# Patient Record
Sex: FEMALE | Race: WHITE | NOT HISPANIC OR LATINO | Employment: OTHER | URBAN - NONMETROPOLITAN AREA
[De-identification: names, ages, dates, MRNs, and addresses within clinical notes are randomized per-mention and may not be internally consistent; named-entity substitution may affect disease eponyms.]

---

## 2020-11-10 ENCOUNTER — OFFICE VISIT (OUTPATIENT)
Dept: OBSTETRICS AND GYNECOLOGY | Facility: CLINIC | Age: 35
End: 2020-11-10

## 2020-11-10 VITALS
BODY MASS INDEX: 46.01 KG/M2 | DIASTOLIC BLOOD PRESSURE: 84 MMHG | SYSTOLIC BLOOD PRESSURE: 126 MMHG | WEIGHT: 250 LBS | HEIGHT: 62 IN

## 2020-11-10 DIAGNOSIS — E28.2 PCOS (POLYCYSTIC OVARIAN SYNDROME): ICD-10-CM

## 2020-11-10 DIAGNOSIS — E66.01 MORBID OBESITY WITH BMI OF 40.0-44.9, ADULT (HCC): ICD-10-CM

## 2020-11-10 DIAGNOSIS — N93.9 ABNORMAL UTERINE BLEEDING (AUB): Primary | ICD-10-CM

## 2020-11-10 PROCEDURE — 99204 OFFICE O/P NEW MOD 45 MIN: CPT | Performed by: OBSTETRICS & GYNECOLOGY

## 2020-11-10 PROCEDURE — 58100 BIOPSY OF UTERUS LINING: CPT | Performed by: OBSTETRICS & GYNECOLOGY

## 2020-11-10 RX ORDER — PANTOPRAZOLE SODIUM 40 MG/1
TABLET, DELAYED RELEASE ORAL
Status: ON HOLD | COMMUNITY
Start: 2020-10-07 | End: 2023-03-23

## 2020-11-12 ENCOUNTER — TELEPHONE (OUTPATIENT)
Dept: OBSTETRICS AND GYNECOLOGY | Facility: CLINIC | Age: 35
End: 2020-11-12

## 2020-11-12 RX ORDER — MEDROXYPROGESTERONE ACETATE 10 MG/1
10 TABLET ORAL DAILY
Qty: 30 TABLET | Refills: 5 | Status: SHIPPED | OUTPATIENT
Start: 2020-11-12 | End: 2020-11-24

## 2020-11-12 NOTE — TELEPHONE ENCOUNTER
Start Provera 10 mg daily x 1 month.  I have called this into the pharmacy listed in her chart.  We will follow-up on 11/24 and discuss other medical options moving forward.  Thanks

## 2020-11-12 NOTE — TELEPHONE ENCOUNTER
----- Message from Parul Ayala sent at 11/12/2020  9:58 AM EST -----  Pt said she had been on her cycle for 3 wks & stopped on Sun & then  started back this morning.     She is requesting a callback. (Dr Rodgers's pt)

## 2020-11-13 DIAGNOSIS — N93.9 ABNORMAL UTERINE BLEEDING (AUB): ICD-10-CM

## 2020-11-17 PROBLEM — E28.2 PCOS (POLYCYSTIC OVARIAN SYNDROME): Status: ACTIVE | Noted: 2020-11-17

## 2020-11-17 PROBLEM — N93.9 ABNORMAL UTERINE BLEEDING (AUB): Status: ACTIVE | Noted: 2020-11-17

## 2020-11-17 PROBLEM — E66.01 MORBID OBESITY WITH BMI OF 40.0-44.9, ADULT (HCC): Status: ACTIVE | Noted: 2020-11-17
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
## 2020-11-24 ENCOUNTER — TELEPHONE (OUTPATIENT)
Dept: OBSTETRICS AND GYNECOLOGY | Facility: CLINIC | Age: 35
End: 2020-11-24

## 2020-11-24 ENCOUNTER — OFFICE VISIT (OUTPATIENT)
Dept: OBSTETRICS AND GYNECOLOGY | Facility: CLINIC | Age: 35
End: 2020-11-24

## 2020-11-24 VITALS
SYSTOLIC BLOOD PRESSURE: 152 MMHG | BODY MASS INDEX: 46.38 KG/M2 | HEIGHT: 62 IN | WEIGHT: 252 LBS | DIASTOLIC BLOOD PRESSURE: 80 MMHG

## 2020-11-24 DIAGNOSIS — R73.03 PREDIABETES: ICD-10-CM

## 2020-11-24 DIAGNOSIS — E28.2 PCOS (POLYCYSTIC OVARIAN SYNDROME): Primary | ICD-10-CM

## 2020-11-24 DIAGNOSIS — N91.5 OLIGOMENORRHEA, UNSPECIFIED TYPE: ICD-10-CM

## 2020-11-24 PROCEDURE — 99214 OFFICE O/P EST MOD 30 MIN: CPT | Performed by: OBSTETRICS & GYNECOLOGY

## 2020-11-24 RX ORDER — DROSPIRENONE 4 MG/1
1 TABLET, FILM COATED ORAL DAILY
Qty: 90 TABLET | Refills: 5 | Status: SHIPPED | OUTPATIENT
Start: 2020-11-24 | End: 2020-11-27

## 2020-11-24 NOTE — TELEPHONE ENCOUNTER
----- Message from Mary Rich sent at 11/24/2020  2:12 PM EST -----  Regarding: MEDICATION QUESTION  Patient states that new medication that Dr. Rodgers sent is requiring a prior authorization and it is not in stock. Patient wants to know if she should stay on her current medication until they can get the new med in.    Patient call back: [phone]
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
## 2020-11-24 NOTE — TELEPHONE ENCOUNTER
Please call her insurance company for prior authorization.  Continue Provera until we get coverage for Slynd.  Thanks

## 2020-11-27 RX ORDER — ACETAMINOPHEN AND CODEINE PHOSPHATE 120; 12 MG/5ML; MG/5ML
1 SOLUTION ORAL DAILY
Qty: 28 TABLET | Refills: 12 | Status: ON HOLD | OUTPATIENT
Start: 2020-11-27 | End: 2023-03-23

## 2020-11-27 NOTE — TELEPHONE ENCOUNTER
Please let this patient know that I have called in a new prescription.  This particular pill has a higher failure rate, so I would recommend condoms for contraception as well.  Thanks

## 2023-03-23 ENCOUNTER — HOSPITAL ENCOUNTER (INPATIENT)
Facility: HOSPITAL | Age: 38
LOS: 4 days | Discharge: HOME | DRG: 885 | End: 2023-03-27
Attending: PSYCHIATRY & NEUROLOGY | Admitting: PSYCHIATRY & NEUROLOGY
Payer: MEDICAID

## 2023-03-23 ENCOUNTER — HOSPITAL ENCOUNTER (EMERGENCY)
Facility: HOSPITAL | Age: 38
Discharge: PSYCHIATRIC OR SUBSTANCE USE FACILITY | DRG: 885 | End: 2023-03-23
Attending: STUDENT IN AN ORGANIZED HEALTH CARE EDUCATION/TRAINING PROGRAM | Admitting: STUDENT IN AN ORGANIZED HEALTH CARE EDUCATION/TRAINING PROGRAM
Payer: MEDICAID

## 2023-03-23 VITALS
DIASTOLIC BLOOD PRESSURE: 79 MMHG | OXYGEN SATURATION: 96 % | TEMPERATURE: 97.1 F | HEIGHT: 63 IN | WEIGHT: 250 LBS | SYSTOLIC BLOOD PRESSURE: 136 MMHG | HEART RATE: 85 BPM | BODY MASS INDEX: 44.3 KG/M2 | RESPIRATION RATE: 20 BRPM

## 2023-03-23 DIAGNOSIS — R45.851 SUICIDAL IDEATIONS: Primary | ICD-10-CM

## 2023-03-23 PROBLEM — F32.9 MDD (MAJOR DEPRESSIVE DISORDER): Status: ACTIVE | Noted: 2023-03-23

## 2023-03-23 LAB
ALBUMIN SERPL-MCNC: 4 G/DL (ref 3.5–5.2)
ALBUMIN/GLOB SERPL: 1 G/DL
ALP SERPL-CCNC: 116 U/L (ref 39–117)
ALT SERPL W P-5'-P-CCNC: 23 U/L (ref 1–33)
AMPHET+METHAMPHET UR QL: NEGATIVE
AMPHETAMINES UR QL: NEGATIVE
ANION GAP SERPL CALCULATED.3IONS-SCNC: 9.5 MMOL/L (ref 5–15)
AST SERPL-CCNC: 19 U/L (ref 1–32)
B-HCG UR QL: NEGATIVE
BACTERIA UR QL AUTO: ABNORMAL /HPF
BARBITURATES UR QL SCN: NEGATIVE
BASOPHILS # BLD AUTO: 0.05 10*3/MM3 (ref 0–0.2)
BASOPHILS NFR BLD AUTO: 0.3 % (ref 0–1.5)
BENZODIAZ UR QL SCN: NEGATIVE
BILIRUB SERPL-MCNC: 0.2 MG/DL (ref 0–1.2)
BILIRUB UR QL STRIP: NEGATIVE
BUN SERPL-MCNC: 7 MG/DL (ref 6–20)
BUN/CREAT SERPL: 7.8 (ref 7–25)
BUPRENORPHINE SERPL-MCNC: NEGATIVE NG/ML
CALCIUM SPEC-SCNC: 9.5 MG/DL (ref 8.6–10.5)
CANNABINOIDS SERPL QL: NEGATIVE
CHLORIDE SERPL-SCNC: 103 MMOL/L (ref 98–107)
CLARITY UR: CLEAR
CO2 SERPL-SCNC: 26.5 MMOL/L (ref 22–29)
COCAINE UR QL: NEGATIVE
COLOR UR: YELLOW
CREAT SERPL-MCNC: 0.9 MG/DL (ref 0.57–1)
DEPRECATED RDW RBC AUTO: 51.7 FL (ref 37–54)
EGFRCR SERPLBLD CKD-EPI 2021: 84.6 ML/MIN/1.73
EOSINOPHIL # BLD AUTO: 0.18 10*3/MM3 (ref 0–0.4)
EOSINOPHIL NFR BLD AUTO: 1.2 % (ref 0.3–6.2)
ERYTHROCYTE [DISTWIDTH] IN BLOOD BY AUTOMATED COUNT: 15.9 % (ref 12.3–15.4)
ETHANOL BLD-MCNC: <10 MG/DL (ref 0–10)
ETHANOL UR QL: <0.01 %
FLUAV RNA RESP QL NAA+PROBE: NOT DETECTED
FLUBV RNA ISLT QL NAA+PROBE: NOT DETECTED
GLOBULIN UR ELPH-MCNC: 3.9 GM/DL
GLUCOSE SERPL-MCNC: 96 MG/DL (ref 65–99)
GLUCOSE UR STRIP-MCNC: NEGATIVE MG/DL
HCT VFR BLD AUTO: 46.3 % (ref 34–46.6)
HGB BLD-MCNC: 14.6 G/DL (ref 12–15.9)
HGB UR QL STRIP.AUTO: ABNORMAL
HOLD SPECIMEN: NORMAL
HOLD SPECIMEN: NORMAL
HYALINE CASTS UR QL AUTO: ABNORMAL /LPF
IMM GRANULOCYTES # BLD AUTO: 0.06 10*3/MM3 (ref 0–0.05)
IMM GRANULOCYTES NFR BLD AUTO: 0.4 % (ref 0–0.5)
KETONES UR QL STRIP: NEGATIVE
LEUKOCYTE ESTERASE UR QL STRIP.AUTO: NEGATIVE
LYMPHOCYTES # BLD AUTO: 4.72 10*3/MM3 (ref 0.7–3.1)
LYMPHOCYTES NFR BLD AUTO: 32.3 % (ref 19.6–45.3)
MAGNESIUM SERPL-MCNC: 2 MG/DL (ref 1.6–2.6)
MCH RBC QN AUTO: 27.9 PG (ref 26.6–33)
MCHC RBC AUTO-ENTMCNC: 31.5 G/DL (ref 31.5–35.7)
MCV RBC AUTO: 88.5 FL (ref 79–97)
METHADONE UR QL SCN: NEGATIVE
MONOCYTES # BLD AUTO: 0.56 10*3/MM3 (ref 0.1–0.9)
MONOCYTES NFR BLD AUTO: 3.8 % (ref 5–12)
NEUTROPHILS NFR BLD AUTO: 62 % (ref 42.7–76)
NEUTROPHILS NFR BLD AUTO: 9.05 10*3/MM3 (ref 1.7–7)
NITRITE UR QL STRIP: NEGATIVE
NRBC BLD AUTO-RTO: 0 /100 WBC (ref 0–0.2)
OPIATES UR QL: NEGATIVE
OXYCODONE UR QL SCN: NEGATIVE
PCP UR QL SCN: NEGATIVE
PH UR STRIP.AUTO: 6 [PH] (ref 5–8)
PLATELET # BLD AUTO: 317 10*3/MM3 (ref 140–450)
PMV BLD AUTO: 10.5 FL (ref 6–12)
POTASSIUM SERPL-SCNC: 3.8 MMOL/L (ref 3.5–5.2)
PROPOXYPH UR QL: NEGATIVE
PROT SERPL-MCNC: 7.9 G/DL (ref 6–8.5)
PROT UR QL STRIP: NEGATIVE
RBC # BLD AUTO: 5.23 10*6/MM3 (ref 3.77–5.28)
RBC # UR STRIP: ABNORMAL /HPF
REF LAB TEST METHOD: ABNORMAL
SARS-COV-2 RNA RESP QL NAA+PROBE: NOT DETECTED
SODIUM SERPL-SCNC: 139 MMOL/L (ref 136–145)
SP GR UR STRIP: 1.01 (ref 1–1.03)
SQUAMOUS #/AREA URNS HPF: ABNORMAL /HPF
TRICYCLICS UR QL SCN: NEGATIVE
UROBILINOGEN UR QL STRIP: ABNORMAL
WBC # UR STRIP: ABNORMAL /HPF
WBC NRBC COR # BLD: 14.62 10*3/MM3 (ref 3.4–10.8)
WHOLE BLOOD HOLD COAG: NORMAL
WHOLE BLOOD HOLD SPECIMEN: NORMAL

## 2023-03-23 PROCEDURE — 80053 COMPREHEN METABOLIC PANEL: CPT | Performed by: PHYSICIAN ASSISTANT

## 2023-03-23 PROCEDURE — 82077 ASSAY SPEC XCP UR&BREATH IA: CPT | Performed by: PHYSICIAN ASSISTANT

## 2023-03-23 PROCEDURE — 85025 COMPLETE CBC W/AUTO DIFF WBC: CPT | Performed by: PHYSICIAN ASSISTANT

## 2023-03-23 PROCEDURE — 80306 DRUG TEST PRSMV INSTRMNT: CPT | Performed by: PHYSICIAN ASSISTANT

## 2023-03-23 PROCEDURE — 99285 EMERGENCY DEPT VISIT HI MDM: CPT

## 2023-03-23 PROCEDURE — 93010 ELECTROCARDIOGRAM REPORT: CPT | Performed by: INTERNAL MEDICINE

## 2023-03-23 PROCEDURE — C9803 HOPD COVID-19 SPEC COLLECT: HCPCS

## 2023-03-23 PROCEDURE — 93005 ELECTROCARDIOGRAM TRACING: CPT | Performed by: PSYCHIATRY & NEUROLOGY

## 2023-03-23 PROCEDURE — 81025 URINE PREGNANCY TEST: CPT | Performed by: PHYSICIAN ASSISTANT

## 2023-03-23 PROCEDURE — 83735 ASSAY OF MAGNESIUM: CPT | Performed by: PHYSICIAN ASSISTANT

## 2023-03-23 PROCEDURE — 36415 COLL VENOUS BLD VENIPUNCTURE: CPT

## 2023-03-23 PROCEDURE — 87636 SARSCOV2 & INF A&B AMP PRB: CPT | Performed by: PHYSICIAN ASSISTANT

## 2023-03-23 PROCEDURE — 81001 URINALYSIS AUTO W/SCOPE: CPT | Performed by: PHYSICIAN ASSISTANT

## 2023-03-23 RX ORDER — ONDANSETRON 4 MG/1
4 TABLET, FILM COATED ORAL EVERY 6 HOURS PRN
Status: DISCONTINUED | OUTPATIENT
Start: 2023-03-23 | End: 2023-03-27 | Stop reason: HOSPADM

## 2023-03-23 RX ORDER — IBUPROFEN 400 MG/1
400 TABLET ORAL EVERY 6 HOURS PRN
Status: DISCONTINUED | OUTPATIENT
Start: 2023-03-23 | End: 2023-03-27 | Stop reason: HOSPADM

## 2023-03-23 RX ORDER — BENZONATATE 100 MG/1
100 CAPSULE ORAL 3 TIMES DAILY PRN
Status: DISCONTINUED | OUTPATIENT
Start: 2023-03-23 | End: 2023-03-27 | Stop reason: HOSPADM

## 2023-03-23 RX ORDER — NICOTINE 21 MG/24HR
1 PATCH, TRANSDERMAL 24 HOURS TRANSDERMAL ONCE
Status: DISCONTINUED | OUTPATIENT
Start: 2023-03-23 | End: 2023-03-23 | Stop reason: HOSPADM

## 2023-03-23 RX ORDER — TRAZODONE HYDROCHLORIDE 50 MG/1
50 TABLET ORAL NIGHTLY PRN
Status: DISCONTINUED | OUTPATIENT
Start: 2023-03-23 | End: 2023-03-27 | Stop reason: HOSPADM

## 2023-03-23 RX ORDER — ECHINACEA PURPUREA EXTRACT 125 MG
2 TABLET ORAL AS NEEDED
Status: DISCONTINUED | OUTPATIENT
Start: 2023-03-23 | End: 2023-03-27 | Stop reason: HOSPADM

## 2023-03-23 RX ORDER — ALUMINA, MAGNESIA, AND SIMETHICONE 2400; 2400; 240 MG/30ML; MG/30ML; MG/30ML
15 SUSPENSION ORAL EVERY 6 HOURS PRN
Status: DISCONTINUED | OUTPATIENT
Start: 2023-03-23 | End: 2023-03-27 | Stop reason: HOSPADM

## 2023-03-23 RX ORDER — HYDROXYZINE 50 MG/1
50 TABLET, FILM COATED ORAL EVERY 6 HOURS PRN
Status: DISCONTINUED | OUTPATIENT
Start: 2023-03-23 | End: 2023-03-27 | Stop reason: HOSPADM

## 2023-03-23 RX ORDER — LOPERAMIDE HYDROCHLORIDE 2 MG/1
2 CAPSULE ORAL
Status: DISCONTINUED | OUTPATIENT
Start: 2023-03-23 | End: 2023-03-27 | Stop reason: HOSPADM

## 2023-03-23 RX ORDER — BENZTROPINE MESYLATE 1 MG/ML
1 INJECTION INTRAMUSCULAR; INTRAVENOUS ONCE AS NEEDED
Status: DISCONTINUED | OUTPATIENT
Start: 2023-03-23 | End: 2023-03-27 | Stop reason: HOSPADM

## 2023-03-23 RX ORDER — ACETAMINOPHEN 325 MG/1
650 TABLET ORAL EVERY 6 HOURS PRN
Status: DISCONTINUED | OUTPATIENT
Start: 2023-03-23 | End: 2023-03-27 | Stop reason: HOSPADM

## 2023-03-23 RX ORDER — FAMOTIDINE 20 MG/1
20 TABLET, FILM COATED ORAL 2 TIMES DAILY PRN
Status: DISCONTINUED | OUTPATIENT
Start: 2023-03-23 | End: 2023-03-27 | Stop reason: HOSPADM

## 2023-03-23 RX ORDER — BENZTROPINE MESYLATE 1 MG/1
2 TABLET ORAL ONCE AS NEEDED
Status: DISCONTINUED | OUTPATIENT
Start: 2023-03-23 | End: 2023-03-27 | Stop reason: HOSPADM

## 2023-03-23 RX ORDER — NICOTINE 21 MG/24HR
1 PATCH, TRANSDERMAL 24 HOURS TRANSDERMAL
Status: DISCONTINUED | OUTPATIENT
Start: 2023-03-24 | End: 2023-03-24

## 2023-03-23 RX ADMIN — NICOTINE TRANSDERMAL SYSTEM 1 PATCH: 21 PATCH, EXTENDED RELEASE TRANSDERMAL at 16:12
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
## 2023-03-23 NOTE — NURSING NOTE
Patient arrived to intake and states that [facility] cps had her knife. She states that she gave it to them when they arrived at her house. The [facility] police department gave her a ride here today. She states that they came out because there were reports of cocroaches and bed bugs. She states they have been there all day. And even took her to the laundry mat and washed all her clothing not including what she has on. Patient states while there she opened up to them about her depression and bipolar and told them that she was having thoughts of wanting to slit her wrists. Patient states that her son and her lives with her dad and he is controlling and constantly puts her down and calls her names all the time and she stays depressed. Anxiety and depression 10/10. Patient denies HI or AVH. Patient also denies etoh or drug use. States I only drink caffeine and smoke cigarettes all day. Patient states that [employer] is helping her and will be putting her son in foster care today until she gets out which she hopes is not not too long. Sleep poor.

## 2023-03-23 NOTE — ED PROVIDER NOTES
"Subjective   History of Present Illness  37-year-old female who presents to the ED today for a mental health evaluation.  She reports that she has been having suicidal ideations for about 2 months.  She reports that she lives with her father who is a \"narcissistic asshole.\"  She states he called [employer], lazy and a bad mother.  She states she made a plan to slit her wrist today.  She denies any homicidal ideations.  She denies any drug or alcohol use.  She states her appetite and sleep have both been poor.    History provided by:  Patient  Mental Health Problem  Presenting symptoms: depression and suicidal thoughts    Degree of incapacity (severity):  Severe  Onset quality:  Gradual  Duration:  2 months  Timing:  Constant  Progression:  Worsening  Chronicity:  New  Context: not alcohol use and not drug abuse    Relieved by:  Nothing  Worsened by:  Family interactions  Associated symptoms: no appetite change and no insomnia    Risk factors: hx of mental illness        Review of Systems   Constitutional: Negative for appetite change.   HENT: Negative.    Eyes: Negative.    Respiratory: Negative.    Cardiovascular: Negative.    Gastrointestinal: Negative.    Genitourinary: Negative.    Musculoskeletal: Negative.    Skin: Negative.    Neurological: Negative.    Psychiatric/Behavioral: Positive for dysphoric mood, sleep disturbance and suicidal ideas. The patient does not have insomnia.    All other systems reviewed and are negative.      Past Medical History:   Diagnosis Date   • Abnormal Pap smear of cervix    • Anxiety    • Bipolar disorder (HCC)    • Depression    • Hiatal hernia    • Migraine    • Ovarian cyst    • Pica    • Polycystic ovary syndrome    • Self-injurious behavior     from age 15 to late 20's   • Substance abuse (HCC)     reports hx of MARLENY- clean and sober since 27 years old   • Suicide attempt (HCC)     early 20's- cut right wrist with broken glass   • Trauma    • Trichotillomania        Allergies "   Allergen Reactions   • Codeine Other (See Comments)     As a child       Past Surgical History:   Procedure Laterality Date   • CHOLECYSTECTOMY  2014   • TONSILLECTOMY AND ADENOIDECTOMY Bilateral     at 5 years old       Family History   Problem Relation Age of Onset   • Drug abuse Mother    • Breast cancer Mother    • Seizures Father    • Alcohol abuse Father    • Breast cancer Father    • Prostate cancer Father    • Melanoma Father    • Lung cancer Father    • Clotting disorder Father    • Glaucoma Father    • Kidney disease Father    • Schizophrenia Brother    • Breast cancer Paternal Aunt    • Breast cancer Maternal Grandmother    • Breast cancer Paternal Grandmother        Social History     Socioeconomic History   • Marital status: Single   • Number of children: 1   • Years of education: 10   • Highest education level: GED or equivalent   Tobacco Use   • Smoking status: Every Day   • Smokeless tobacco: Never   Vaping Use   • Vaping Use: Never used   Substance and Sexual Activity   • Alcohol use: Never   • Drug use: Not Currently     Comment: reports she has been clean and sober for 10 years   • Sexual activity: Not Currently     Partners: Male     Birth control/protection: Abstinence           Objective   Physical Exam  Vitals and nursing note reviewed.   Constitutional:       General: She is not in acute distress.     Appearance: Normal appearance.   HENT:      Head: Normocephalic and atraumatic.      Right Ear: External ear normal.      Left Ear: External ear normal.   Eyes:      Conjunctiva/sclera: Conjunctivae normal.      Pupils: Pupils are equal, round, and reactive to light.   Cardiovascular:      Rate and Rhythm: Normal rate and regular rhythm.      Pulses: Normal pulses.      Heart sounds: Normal heart sounds.   Pulmonary:      Effort: Pulmonary effort is normal.      Breath sounds: Normal breath sounds.   Abdominal:      General: Bowel sounds are normal.      Palpations: Abdomen is soft.    Musculoskeletal:         General: Normal range of motion.      Cervical back: Normal range of motion and neck supple.   Skin:     General: Skin is warm and dry.      Capillary Refill: Capillary refill takes less than 2 seconds.   Neurological:      General: No focal deficit present.      Mental Status: She is alert and oriented to person, place, and time.   Psychiatric:         Mood and Affect: Mood and affect normal.         Speech: Speech normal.         Behavior: Behavior normal. Behavior is cooperative.         Thought Content: Thought content includes suicidal ideation. Thought content does not include homicidal ideation. Thought content includes suicidal plan.         Procedures        Results for orders placed or performed during the hospital encounter of 03/23/23   COVID-19 and FLU A/B PCR - Swab, Nasopharynx    Specimen: Nasopharynx; Swab   Result Value Ref Range    COVID19 Not Detected Not Detected - Ref. Range    Influenza A PCR Not Detected Not Detected    Influenza B PCR Not Detected Not Detected   Comprehensive Metabolic Panel    Specimen: Arm, Right; Blood   Result Value Ref Range    Glucose 96 65 - 99 mg/dL    BUN 7 6 - 20 mg/dL    Creatinine 0.90 0.57 - 1.00 mg/dL    Sodium 139 136 - 145 mmol/L    Potassium 3.8 3.5 - 5.2 mmol/L    Chloride 103 98 - 107 mmol/L    CO2 26.5 22.0 - 29.0 mmol/L    Calcium 9.5 8.6 - 10.5 mg/dL    Total Protein 7.9 6.0 - 8.5 g/dL    Albumin 4.0 3.5 - 5.2 g/dL    ALT (SGPT) 23 1 - 33 U/L    AST (SGOT) 19 1 - 32 U/L    Alkaline Phosphatase 116 39 - 117 U/L    Total Bilirubin 0.2 0.0 - 1.2 mg/dL    Globulin 3.9 gm/dL    A/G Ratio 1.0 g/dL    BUN/Creatinine Ratio 7.8 7.0 - 25.0    Anion Gap 9.5 5.0 - 15.0 mmol/L    eGFR 84.6 >60.0 mL/min/1.73   Pregnancy, Urine - Urine, Clean Catch    Specimen: Urine, Clean Catch   Result Value Ref Range    HCG, Urine QL Negative Negative   Urinalysis With Microscopic If Indicated (No Culture) - Urine, Clean Catch    Specimen: Urine, Clean  Catch   Result Value Ref Range    Color, UA Yellow Yellow, Straw    Appearance, UA Clear Clear    pH, UA 6.0 5.0 - 8.0    Specific Gravity, UA 1.012 1.005 - 1.030    Glucose, UA Negative Negative    Ketones, UA Negative Negative    Bilirubin, UA Negative Negative    Blood, UA Small (1+) (A) Negative    Protein, UA Negative Negative    Leuk Esterase, UA Negative Negative    Nitrite, UA Negative Negative    Urobilinogen, UA 0.2 E.U./dL 0.2 - 1.0 E.U./dL   Ethanol    Specimen: Arm, Right; Blood   Result Value Ref Range    Ethanol <10 0 - 10 mg/dL    Ethanol % <0.010 %   Urine Drug Screen - Urine, Clean Catch    Specimen: Urine, Clean Catch   Result Value Ref Range    THC, Screen, Urine Negative Negative    Phencyclidine (PCP), Urine Negative Negative    Cocaine Screen, Urine Negative Negative    Methamphetamine, Ur Negative Negative    Opiate Screen Negative Negative    Amphetamine Screen, Urine Negative Negative    Benzodiazepine Screen, Urine Negative Negative    Tricyclic Antidepressants Screen Negative Negative    Methadone Screen, Urine Negative Negative    Barbiturates Screen, Urine Negative Negative    Oxycodone Screen, Urine Negative Negative    Propoxyphene Screen Negative Negative    Buprenorphine, Screen, Urine Negative Negative   Magnesium    Specimen: Arm, Right; Blood   Result Value Ref Range    Magnesium 2.0 1.6 - 2.6 mg/dL   CBC Auto Differential    Specimen: Arm, Right; Blood   Result Value Ref Range    WBC 14.62 (H) 3.40 - 10.80 10*3/mm3    RBC 5.23 3.77 - 5.28 10*6/mm3    Hemoglobin 14.6 12.0 - 15.9 g/dL    Hematocrit 46.3 34.0 - 46.6 %    MCV 88.5 79.0 - 97.0 fL    MCH 27.9 26.6 - 33.0 pg    MCHC 31.5 31.5 - 35.7 g/dL    RDW 15.9 (H) 12.3 - 15.4 %    RDW-SD 51.7 37.0 - 54.0 fl    MPV 10.5 6.0 - 12.0 fL    Platelets 317 140 - 450 10*3/mm3    Neutrophil % 62.0 42.7 - 76.0 %    Lymphocyte % 32.3 19.6 - 45.3 %    Monocyte % 3.8 (L) 5.0 - 12.0 %    Eosinophil % 1.2 0.3 - 6.2 %    Basophil % 0.3 0.0 - 1.5  %    Immature Grans % 0.4 0.0 - 0.5 %    Neutrophils, Absolute 9.05 (H) 1.70 - 7.00 10*3/mm3    Lymphocytes, Absolute 4.72 (H) 0.70 - 3.10 10*3/mm3    Monocytes, Absolute 0.56 0.10 - 0.90 10*3/mm3    Eosinophils, Absolute 0.18 0.00 - 0.40 10*3/mm3    Basophils, Absolute 0.05 0.00 - 0.20 10*3/mm3    Immature Grans, Absolute 0.06 (H) 0.00 - 0.05 10*3/mm3    nRBC 0.0 0.0 - 0.2 /100 WBC   Urinalysis, Microscopic Only - Urine, Clean Catch    Specimen: Urine, Clean Catch   Result Value Ref Range    RBC, UA 3-5 (A) None Seen, 0-2 /HPF    WBC, UA 3-5 (A) None Seen, 0-2 /HPF    Bacteria, UA 1+ (A) None Seen /HPF    Squamous Epithelial Cells, UA 3-6 (A) None Seen, 0-2 /HPF    Hyaline Casts, UA None Seen None Seen /LPF    Methodology Manual Light Microscopy    Green Top (Gel)   Result Value Ref Range    Extra Tube Hold for add-ons.    Lavender Top   Result Value Ref Range    Extra Tube hold for add-on    Gold Top - SST   Result Value Ref Range    Extra Tube Hold for add-ons.    Light Blue Top   Result Value Ref Range    Extra Tube Hold for add-ons.          ED Course                                           Medical Decision Making  37-year-old female who presents to the ED today for a mental health evaluation.  She was medically cleared for a psychiatric evaluation.  Psychiatry was consulted who recommended inpatient admission.    Suicidal ideations: complicated acute illness or injury  Amount and/or Complexity of Data Reviewed  Labs: ordered.          Final diagnoses:   Suicidal ideations       ED Disposition  ED Disposition     ED Disposition   DC/Transfer to Behavioral Health    Condition   Stable    Comment   --             No follow-up provider specified.       Medication List      No changes were made to your prescriptions during this visit.          Guerline Matias, PA  03/23/23 0917

## 2023-03-23 NOTE — ACP (ADVANCE CARE PLANNING)
Patient requested to go outside to smoke. No smoking policy discussed. Offered pt a nicotine patch. ER provider made aware.

## 2023-03-23 NOTE — NURSING NOTE
Called and spoke to Dr. Fierro. Intake information provided and labs discussed. Instructed that she can come in with routine orders. [id number]

## 2023-03-24 ENCOUNTER — APPOINTMENT (OUTPATIENT)
Dept: GENERAL RADIOLOGY | Facility: HOSPITAL | Age: 38
DRG: 885 | End: 2023-03-24
Payer: MEDICAID

## 2023-03-24 LAB
QT INTERVAL: 384 MS
QTC INTERVAL: 432 MS

## 2023-03-24 PROCEDURE — 99223 1ST HOSP IP/OBS HIGH 75: CPT | Performed by: PSYCHIATRY & NEUROLOGY

## 2023-03-24 PROCEDURE — 73120 X-RAY EXAM OF HAND: CPT

## 2023-03-24 RX ORDER — FLUOXETINE HYDROCHLORIDE 20 MG/1
20 CAPSULE ORAL DAILY
Status: DISCONTINUED | OUTPATIENT
Start: 2023-03-24 | End: 2023-03-27 | Stop reason: HOSPADM

## 2023-03-24 RX ORDER — LAMOTRIGINE 100 MG/1
25 TABLET ORAL EVERY 12 HOURS SCHEDULED
Status: DISCONTINUED | OUTPATIENT
Start: 2023-03-24 | End: 2023-03-27 | Stop reason: HOSPADM

## 2023-03-24 RX ORDER — METRONIDAZOLE 250 MG/1
500 TABLET ORAL EVERY 8 HOURS SCHEDULED
Status: DISCONTINUED | OUTPATIENT
Start: 2023-03-24 | End: 2023-03-27 | Stop reason: HOSPADM

## 2023-03-24 RX ADMIN — NICOTINE TRANSDERMAL SYSTEM 1 PATCH: 21 PATCH, EXTENDED RELEASE TRANSDERMAL at 08:17

## 2023-03-24 RX ADMIN — ACETAMINOPHEN 650 MG: 325 TABLET ORAL at 20:30

## 2023-03-24 RX ADMIN — METRONIDAZOLE 500 MG: 250 TABLET ORAL at 15:38

## 2023-03-24 RX ADMIN — LAMOTRIGINE 25 MG: 100 TABLET ORAL at 20:30

## 2023-03-24 RX ADMIN — FLUOXETINE HYDROCHLORIDE 20 MG: 20 CAPSULE ORAL at 14:37

## 2023-03-24 RX ADMIN — METRONIDAZOLE 500 MG: 250 TABLET ORAL at 21:50

## 2023-03-24 RX ADMIN — NICOTINE POLACRILEX 4 MG: 2 GUM, CHEWING BUCCAL at 17:05
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
## 2023-03-24 NOTE — PLAN OF CARE
Goal Outcome Evaluation:  Plan of Care Reviewed With: patient  Patient Agreement with Plan of Care: agrees     Progress: improving pt. Denies depression denies anxiety denies a/v/h pt. Has played cards through out day talkative laughing she wears a white hat to cover head per pt. Hx. Pulling her hair out she has been pleasant through out shift .
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
## 2023-03-24 NOTE — PROGRESS NOTES
Navigator is helping Primary Therapist with the following referrals:    [facility] - [phone]  -Intake staff states to call day of/ day before discharge with referral information.  3/24

## 2023-03-24 NOTE — PLAN OF CARE
Goal Outcome Evaluation:  Plan of Care Reviewed With: patient  Patient Agreement with Plan of Care: agrees     Progress: no change  Outcome Evaluation: Patient denied SI, HI, AVH. Calm and cooperative.

## 2023-03-25 LAB
BILIRUB UR QL STRIP: NEGATIVE
CLARITY UR: CLEAR
COLOR UR: YELLOW
GLUCOSE UR STRIP-MCNC: NEGATIVE MG/DL
HGB UR QL STRIP.AUTO: NEGATIVE
KETONES UR QL STRIP: NEGATIVE
LEUKOCYTE ESTERASE UR QL STRIP.AUTO: NEGATIVE
NITRITE UR QL STRIP: NEGATIVE
PH UR STRIP.AUTO: 6 [PH] (ref 5–8)
PROT UR QL STRIP: NEGATIVE
SP GR UR STRIP: 1.01 (ref 1–1.03)
UROBILINOGEN UR QL STRIP: NORMAL

## 2023-03-25 PROCEDURE — 99232 SBSQ HOSP IP/OBS MODERATE 35: CPT | Performed by: PSYCHIATRY & NEUROLOGY

## 2023-03-25 PROCEDURE — 81003 URINALYSIS AUTO W/O SCOPE: CPT | Performed by: PSYCHIATRY & NEUROLOGY

## 2023-03-25 RX ORDER — DIPHENHYDRAMINE HCL 25 MG
25 CAPSULE ORAL ONCE
Status: COMPLETED | OUTPATIENT
Start: 2023-03-26 | End: 2023-03-26

## 2023-03-25 RX ORDER — NICOTINE 21 MG/24HR
1 PATCH, TRANSDERMAL 24 HOURS TRANSDERMAL DAILY
Status: DISCONTINUED | OUTPATIENT
Start: 2023-03-25 | End: 2023-03-27 | Stop reason: HOSPADM

## 2023-03-25 RX ADMIN — METRONIDAZOLE 500 MG: 250 TABLET ORAL at 14:26

## 2023-03-25 RX ADMIN — ACETAMINOPHEN 650 MG: 325 TABLET ORAL at 15:03

## 2023-03-25 RX ADMIN — FLUOXETINE HYDROCHLORIDE 20 MG: 20 CAPSULE ORAL at 08:45

## 2023-03-25 RX ADMIN — METRONIDAZOLE 500 MG: 250 TABLET ORAL at 21:49

## 2023-03-25 RX ADMIN — SALINE NASAL SPRAY 2 SPRAY: 1.5 SOLUTION NASAL at 21:50

## 2023-03-25 RX ADMIN — NICOTINE TRANSDERMAL SYSTEM 1 PATCH: 21 PATCH, EXTENDED RELEASE TRANSDERMAL at 09:58

## 2023-03-25 RX ADMIN — LAMOTRIGINE 25 MG: 100 TABLET ORAL at 08:45

## 2023-03-25 RX ADMIN — METRONIDAZOLE 500 MG: 250 TABLET ORAL at 08:45

## 2023-03-25 RX ADMIN — LAMOTRIGINE 25 MG: 100 TABLET ORAL at 20:38

## 2023-03-25 RX ADMIN — ACETAMINOPHEN 650 MG: 325 TABLET ORAL at 08:46

## 2023-03-25 RX ADMIN — IBUPROFEN 400 MG: 400 TABLET, FILM COATED ORAL at 02:51

## 2023-03-25 NOTE — NURSING NOTE
Pt c/o left hand first finger and thumb pain 7/10. Trace edema no redness noted. Called Dr. Bolden. New orders given for tylenol, ice, and xray. Pt already took tylenol this evening. Pt verbalized understanding. Ice applied now. Pt instructed not to take off pillowcase and not to leave on more than 15 min at time and to take break from ice for at least 20 min.

## 2023-03-25 NOTE — PROGRESS NOTES
"      Inpatient Psych Progress Note     Clinician: Saroj Ball MD  Admission Date: 3/23/2023  08:21 EDT 03/25/23    Behavioral Health Treatment Plan and Problem List: I have reviewed and approved the Behavioral Health Treatment Plan and Problem list.    Allergies  Allergies   Allergen Reactions   • Fish-Derived Products Nausea And Vomiting   • Mushroom Nausea And Vomiting   • Onion Nausea And Vomiting   • Codeine Other (See Comments)     As a child       Hospital Day: 2 days      Assessment completed within view of staff    History  CC/clinical focus: depression, SI    Interval HPI: Patient seen and evaluated by me.  Chart reviewed. Patient reports feeling a little better today. She have ongoing depression. No SI/HI. She is tolerating her medications well with no reported side effects.   Med Compliant.  ROS otherwise as below.      Interval Review of Systems:   General ROS: negative for - fever or malaise  Endocrine ROS: negative for - palpitations  Respiratory ROS: no cough, shortness of breath, or wheezing  Cardiovascular ROS: no chest pain or dyspnea on exertion  Gastrointestinal ROS: no abdominal pain,no black or bloody stools    /96 (BP Location: Right arm, Patient Position: Sitting)   Pulse 97   Temp 98.1 °F (36.7 °C) (Temporal)   Resp 18   Ht 160 cm (63\")   Wt 113 kg (249 lb)   LMP 03/20/2023   SpO2 96%   BMI 44.11 kg/m²     Mental Status Exam  Mood: dysphoric and depressed  Affect: constricted   Thought Processes: linear, logical, and goal directed  Thought Content: normal  Hallucinations: no  Suicidal Thoughts: denies  Suicidal Plan/Intent: denies  Hopelesness:no  Homicidal Thoughts:  denies      Medical Decision Making:   Labs:     Lab Results (last 24 hours)     ** No results found for the last 24 hours. **            Radiology:     Imaging Results (Last 24 Hours)     Procedure Component Value Units Date/Time    XR Hand 2 View Left [[id number]] Collected: 03/24/23 7687     Updated: " 03/24/23 2322    Narrative:      CR Hand 2 Vws LT    INDICATION:   Pain in the thumb and first finger    COMPARISON:   None available.    FINDINGS:   PA and lateral views of the left hand.  No fracture or dislocation.  No bone erosion or destruction.  No foreign body.      Impression:      Negative left hand.    Signer Name: Gregorio Poe MD   Signed: 3/24/2023 11:20 PM   Workstation Name: [id number]    [facility]            EKG:     ECG/EMG Results (most recent)     Procedure Component Value Units Date/Time    ECG 12 Lead Other [[id number]] Collected: 03/23/23 1724     Updated: 03/24/23 0022     QT Interval 384 ms      QTC Interval 432 ms     Narrative:      Test Reason : Baseline Cardiac Status~  Blood Pressure :   */*   mmHG  Vent. Rate :  76 BPM     Atrial Rate :  76 BPM     P-R Int : 146 ms          QRS Dur :  76 ms      QT Int : 384 ms       P-R-T Axes :  31  61  55 degrees     QTc Int : 432 ms    Normal sinus rhythm  Normal ECG  No previous ECGs available  Confirmed by Joey Richardson ([id number]) on 3/24/2023 12:21:18 AM    Referred By: SALLY           Confirmed By: Joey Richardson           Medications:  FLUoxetine, 20 mg, Oral, Daily  lamoTRIgine, 25 mg, Oral, Q12H  metroNIDAZOLE, 500 mg, Oral, Q8H           All medications reviewed.      Assessment and Plan:     Suicidal Ideation  - SI with plan  - Admit for crisis stabilization  - SP3     Unspecified bipolar disorder  - Continue lamotrigine 25mg BID  - Continue fluoxetine 20mg daily  - We will establish outpatient psychiatric care following hospitalization     Tooth abscess  - Continue augmentin     Alcohol use disorder, severe, dependence, in sustained remission  - 10y sober     Stimulant use disorder, severe, dependence, in sustained remission  - 10y sober    Rule out UTI  - UA concerning for UTI  - Will check culture         Continue hospitalization for safety and stabilization.  Continue current level of Special Precautions (q15 minute  checks).        This note was generated by a scribe, Clarence Vasquez. The work documented in this note was completed, reviewed, and approved by the attending psychiatrist as designated Dr. Saroj Ball electronic signature.     I, Saroj Ball MD, personally performed the services described in this documentation as scribed by the above named individual and is both accurate and complete.
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
## 2023-03-25 NOTE — PLAN OF CARE
Goal Outcome Evaluation:  Plan of Care Reviewed With: patient  Patient Agreement with Plan of Care: agrees        Pt states anxiety 5, depression 5. Pt is calm and cooperative with staff, med compliant.

## 2023-03-26 PROCEDURE — 99232 SBSQ HOSP IP/OBS MODERATE 35: CPT | Performed by: PSYCHIATRY & NEUROLOGY

## 2023-03-26 PROCEDURE — 63710000001 DIPHENHYDRAMINE PER 50 MG: Performed by: PSYCHIATRY & NEUROLOGY

## 2023-03-26 RX ORDER — DIPHENHYDRAMINE HCL 25 MG
25 CAPSULE ORAL NIGHTLY
Status: DISCONTINUED | OUTPATIENT
Start: 2023-03-26 | End: 2023-03-27 | Stop reason: HOSPADM

## 2023-03-26 RX ADMIN — LAMOTRIGINE 25 MG: 100 TABLET ORAL at 20:21

## 2023-03-26 RX ADMIN — SALINE NASAL SPRAY 2 SPRAY: 1.5 SOLUTION NASAL at 11:34

## 2023-03-26 RX ADMIN — METRONIDAZOLE 500 MG: 250 TABLET ORAL at 21:02

## 2023-03-26 RX ADMIN — METRONIDAZOLE 500 MG: 250 TABLET ORAL at 08:24

## 2023-03-26 RX ADMIN — SALINE NASAL SPRAY 2 SPRAY: 1.5 SOLUTION NASAL at 17:05

## 2023-03-26 RX ADMIN — LAMOTRIGINE 25 MG: 100 TABLET ORAL at 08:24

## 2023-03-26 RX ADMIN — FLUOXETINE HYDROCHLORIDE 20 MG: 20 CAPSULE ORAL at 08:24

## 2023-03-26 RX ADMIN — DIPHENHYDRAMINE HYDROCHLORIDE 25 MG: 25 CAPSULE ORAL at 00:09

## 2023-03-26 RX ADMIN — DIPHENHYDRAMINE HYDROCHLORIDE 25 MG: 25 CAPSULE ORAL at 21:49

## 2023-03-26 RX ADMIN — METRONIDAZOLE 500 MG: 250 TABLET ORAL at 13:39

## 2023-03-26 RX ADMIN — NICOTINE TRANSDERMAL SYSTEM 1 PATCH: 21 PATCH, EXTENDED RELEASE TRANSDERMAL at 08:24

## 2023-03-26 NOTE — PROGRESS NOTES
"      Inpatient Psych Progress Note     Clinician: Saroj Ball MD  Admission Date: 3/23/2023  08:50 EDT 03/26/23    Behavioral Health Treatment Plan and Problem List: I have reviewed and approved the Behavioral Health Treatment Plan and Problem list.    Allergies  Allergies   Allergen Reactions   • Fish-Derived Products Nausea And Vomiting   • Mushroom Nausea And Vomiting   • Onion Nausea And Vomiting   • Codeine Other (See Comments)     As a child       Hospital Day: 3 days      Assessment completed within view of staff    History  CC/clinical focus: depression, SI    Interval HPI: Patient seen and evaluated by me.  Chart reviewed. Patient reports that she is feeling a little better today. She denies any side effects from her medications. No SI/HI. She slept well last night after taking Benadryl and is requesting this again tonight.   Med Compliant.  ROS otherwise as below.      Interval Review of Systems:   General ROS: negative for - fever or malaise  Endocrine ROS: negative for - palpitations  Respiratory ROS: no cough, shortness of breath, or wheezing  Cardiovascular ROS: no chest pain or dyspnea on exertion  Gastrointestinal ROS: no abdominal pain,no black or bloody stools    /92 (BP Location: Right arm, Patient Position: Sitting)   Pulse 80   Temp 96.6 °F (35.9 °C) (Temporal)   Resp 18   Ht 160 cm (63\")   Wt 113 kg (249 lb)   LMP 03/20/2023   SpO2 97%   BMI 44.11 kg/m²     Mental Status Exam  Mood: dysphoric and depressed  Affect: constricted   Thought Processes: linear, logical, and goal directed  Thought Content: normal  Hallucinations: no  Suicidal Thoughts: denies  Suicidal Plan/Intent: denies  Hopelesness:no  Homicidal Thoughts:  denies      Medical Decision Making:   Labs:     Lab Results (last 24 hours)     Procedure Component Value Units Date/Time    Urinalysis With Culture If Indicated - Urine, Clean Catch [[id number]]  (Normal) Collected: 03/25/23 1820    Specimen: Urine, Clean Catch " Updated: 03/25/23 1842     Color, UA Yellow     Appearance, UA Clear     pH, UA 6.0     Specific Gravity, UA 1.011     Glucose, UA Negative     Ketones, UA Negative     Bilirubin, UA Negative     Blood, UA Negative     Protein, UA Negative     Leuk Esterase, UA Negative     Nitrite, UA Negative     Urobilinogen, UA 0.2 E.U./dL    Narrative:      In absence of clinical symptoms, the presence of pyuria, bacteria, and/or nitrites on the urinalysis result does not correlate with infection.  Urine microscopic not indicated.            Radiology:     Imaging Results (Last 24 Hours)     ** No results found for the last 24 hours. **            EKG:     ECG/EMG Results (most recent)     Procedure Component Value Units Date/Time    ECG 12 Lead Other [[id number]] Collected: 03/23/23 1724     Updated: 03/24/23 0022     QT Interval 384 ms      QTC Interval 432 ms     Narrative:      Test Reason : Baseline Cardiac Status~  Blood Pressure :   */*   mmHG  Vent. Rate :  76 BPM     Atrial Rate :  76 BPM     P-R Int : 146 ms          QRS Dur :  76 ms      QT Int : 384 ms       P-R-T Axes :  31  61  55 degrees     QTc Int : 432 ms    Normal sinus rhythm  Normal ECG  No previous ECGs available  Confirmed by Joey Richardson ([id number]) on 3/24/2023 12:21:18 AM    Referred By: SALLY           Confirmed By: Joey Richardson           Medications:  FLUoxetine, 20 mg, Oral, Daily  lamoTRIgine, 25 mg, Oral, Q12H  metroNIDAZOLE, 500 mg, Oral, Q8H  nicotine, 1 patch, Transdermal, Daily           All medications reviewed.         Assessment and Plan:      Suicidal Ideation  - SI with plan  - Admit for crisis stabilization  - SP3     Unspecified bipolar disorder  - Continue lamotrigine 25mg BID  - Continue fluoxetine 20mg daily  - We will establish outpatient psychiatric care following hospitalization    Insomnia  - Benadryl 25 mg at bedtime  - Discussed with patient that she should not take Benadryl long-term for sleep, she does not plan to use long-term      Tooth abscess  - Continue augmentin     Alcohol use disorder, severe, dependence, in sustained remission  - 10y sober     Stimulant use disorder, severe, dependence, in sustained remission  - 10y sober     Rule out UTI  - UA initially concerning for UTI  - Repeat UA normal             Continue hospitalization for safety and stabilization.  Continue current level of Special Precautions (q15 minute checks).        This note was generated by a scribe, Clarence Vasquez. The work documented in this note was completed, reviewed, and approved by the attending psychiatrist as designated Dr. Saroj Ball electronic signature.     I, Saroj Ball MD, personally performed the services described in this documentation as scribed by the above named individual and is both accurate and complete.

## 2023-03-26 NOTE — PLAN OF CARE
Problem: Adult Behavioral Health Plan of Care  Goal: Optimized Coping Skills in Response to Life Stressors  Intervention: Promote Effective Coping Strategies  Recent Flowsheet Documentation  Taken 3/26/2023 0749 by Sara Harrison RN  Supportive Measures: positive reinforcement provided     Problem: Adult Behavioral Health Plan of Care  Goal: Develops/Participates in Therapeutic Section to Support Successful Transition  Intervention: Foster Therapeutic Section  Recent Flowsheet Documentation  Taken 3/26/2023 0749 by Sara Harrison RN  Trust Relationship/Rapport:   reassurance provided   thoughts/feelings acknowledged   questions encouraged   questions answered   empathic listening provided   emotional support provided   care explained   choices provided   Goal Outcome Evaluation:  Plan of Care Reviewed With: patient  Patient Agreement with Plan of Care: agrees     Progress: improving

## 2023-03-26 NOTE — NURSING NOTE
Pt c/o insomnia. She is not comfortable taking vistaril or trazadone since she has never taken before. Pt requests one benadryl. Called on called md, Dr. NELLY Ball, new orders given for one time med. Pt tolerated well. She is currently resting in bed eyes closed.

## 2023-03-27 VITALS
SYSTOLIC BLOOD PRESSURE: 131 MMHG | HEIGHT: 63 IN | DIASTOLIC BLOOD PRESSURE: 90 MMHG | OXYGEN SATURATION: 97 % | WEIGHT: 249 LBS | BODY MASS INDEX: 44.12 KG/M2 | TEMPERATURE: 98.1 F | RESPIRATION RATE: 16 BRPM | HEART RATE: 78 BPM

## 2023-03-27 PROBLEM — F31.9 BIPOLAR DISORDER, UNSPECIFIED: Status: ACTIVE | Noted: 2023-03-23

## 2023-03-27 PROBLEM — K04.7 TOOTH ABSCESS: Status: ACTIVE | Noted: 2023-03-27

## 2023-03-27 PROCEDURE — 99239 HOSP IP/OBS DSCHRG MGMT >30: CPT | Performed by: PSYCHIATRY & NEUROLOGY

## 2023-03-27 RX ORDER — FLUOXETINE HYDROCHLORIDE 20 MG/1
20 CAPSULE ORAL DAILY
Qty: 30 CAPSULE | Refills: 0 | Status: SHIPPED | OUTPATIENT
Start: 2023-03-28

## 2023-03-27 RX ORDER — METRONIDAZOLE 500 MG/1
500 TABLET ORAL EVERY 8 HOURS SCHEDULED
Qty: 21 TABLET | Refills: 0 | Status: SHIPPED | OUTPATIENT
Start: 2023-03-27 | End: 2023-04-03

## 2023-03-27 RX ORDER — LAMOTRIGINE 25 MG/1
25 TABLET ORAL EVERY 12 HOURS SCHEDULED
Qty: 60 TABLET | Refills: 0 | Status: SHIPPED | OUTPATIENT
Start: 2023-03-27

## 2023-03-27 RX ADMIN — ACETAMINOPHEN 650 MG: 325 TABLET ORAL at 09:03

## 2023-03-27 RX ADMIN — LAMOTRIGINE 25 MG: 100 TABLET ORAL at 08:32

## 2023-03-27 RX ADMIN — SALINE NASAL SPRAY 2 SPRAY: 1.5 SOLUTION NASAL at 08:33

## 2023-03-27 RX ADMIN — FLUOXETINE HYDROCHLORIDE 20 MG: 20 CAPSULE ORAL at 08:33

## 2023-03-27 RX ADMIN — NICOTINE TRANSDERMAL SYSTEM 1 PATCH: 21 PATCH, EXTENDED RELEASE TRANSDERMAL at 08:32

## 2023-03-27 RX ADMIN — METRONIDAZOLE 500 MG: 250 TABLET ORAL at 13:04

## 2023-03-27 RX ADMIN — METRONIDAZOLE 500 MG: 250 TABLET ORAL at 08:32

## 2023-03-27 NOTE — DISCHARGE INSTR - APPOINTMENTS
Patient to admit to:    [facility]  [address] [place] [address] [phone]

## 2023-03-27 NOTE — PLAN OF CARE
Goal Outcome Evaluation:  Plan of Care Reviewed With: patient  Patient Agreement with Plan of Care: agrees     Progress: improving  Outcome Evaluation: Pt was calm and copperative this shift with no issues or concerns.

## 2023-03-27 NOTE — PROGRESS NOTES
Navigator is helping Primary Therapist with the following referrals:     [facility] - [phone]  -Intake staff states to call day of/ day before discharge with referral information.  3/24  -Staff states to fax over discharge summary for review/approval.  Sent  3/27  -Still in review. 3/27  -Patient approved. They will  around 1:30pm.  3/27

## 2023-03-27 NOTE — DISCHARGE SUMMARY
"      PSYCHIATRIC DISCHARGE SUMMARY     Patient Identification:  Name:  Carey Serna  Age:  37 y.o.  Sex:  female  :  1985  MRN:  [id number]  Visit Number:  [id number]    Date of Admission:3/23/2023   Date of Discharge:  3/27/2023    Discharge Diagnosis:  Principal Problem:    Bipolar disorder, unspecified (HCC)  Active Problems:    Tooth abscess      Admission Diagnosis:  MDD (major depressive disorder) [F32.9]     Hospital Course  Patient is a 37 y.o. female presented with depression and reports of SI.  Admitted for crisis stabilization.  No acutely concerning labs on admission.  Patient resumed on previously successful fluoxetine 20 mg daily and lamotrigine at decreased dose of 25 mg twice daily, to eventually be titrated by outpatient provider.  Patient reports interpersonal difficulties with her father led to mood disturbance.  She reported intermittent passive SI, but denied active desire to ever hurt herself.  She was pleasant and appropriate during her stay, exhibiting no behavior concerning for harm to herself or others.  She voiced rapid improvement and ready for discharge today, with hopes to be admitted to [facility] before she returns home.  Outpatient care ascertained.    On the day of discharge, patient denied SI, HI or AVH. Patient was stable and appropriate by the conclusion of this admission, denying significant symptoms of mood, psychotic or thought disorder. Patient showed improvement of presenting symptoms and was deemed appropriate for discharge today.    Mental Status Exam upon discharge:   Mood \"better\"   Affect-congruent, appropriate, stable  Thought Content-goal directed, no delusional material present  Thought process-linear, organized.  Suicidality: No SI  Homicidality: No HI  Perception: No AH/VH    Procedures Performed         Consults:   Consults     No orders found from 2023 to 3/24/2023.          Pertinent Test Results:   Lab Results (last 7 days)     Procedure " Pt missed procedure apt   Wants to reschedule with a nurse Component Value Units Date/Time    Urinalysis With Culture If Indicated - Urine, Clean Catch [[id number]]  (Normal) Collected: 03/25/23 1820    Specimen: Urine, Clean Catch Updated: 03/25/23 1842     Color, UA Yellow     Appearance, UA Clear     pH, UA 6.0     Specific Gravity, UA 1.011     Glucose, UA Negative     Ketones, UA Negative     Bilirubin, UA Negative     Blood, UA Negative     Protein, UA Negative     Leuk Esterase, UA Negative     Nitrite, UA Negative     Urobilinogen, UA 0.2 E.U./dL    Narrative:      In absence of clinical symptoms, the presence of pyuria, bacteria, and/or nitrites on the urinalysis result does not correlate with infection.  Urine microscopic not indicated.          Condition on Discharge:  improved    Vital Signs  Temp:  [98.5 °F (36.9 °C)] 98.5 °F (36.9 °C)  Heart Rate:  [82-92] 82  Resp:  [18] 18  BP: (138-147)/(84-95) 138/84    Discharge Disposition:  Home or Self Care    Discharge Medications:     Discharge Medications      New Medications      Instructions Start Date   FLUoxetine 20 MG capsule  Commonly known as: PROzac   20 mg, Oral, Daily   Start Date: March 28, 2023     lamoTRIgine 25 MG tablet  Commonly known as: LaMICtal   25 mg, Oral, Every 12 Hours Scheduled      metroNIDAZOLE 500 MG tablet  Commonly known as: FLAGYL   500 mg, Oral, Every 8 Hours Scheduled             Discharge Diet: Normal    Activity at Discharge: Normal    Follow-up Appointments  No future appointments.      Test Results Pending at Discharge  None     Time: I spent greater than 30 minutes on this discharge activity which included: face-to-face encounter with the patient, reviewing the data in the system, coordination of the care with the nursing staff as well as consultants, documentation, and entering orders.      Clinician:   Mekhi Fierro MD  03/27/23  10:40 EDT

## 2023-03-27 NOTE — DISCHARGE PLACEMENT REQUEST
"Afia Oakes (37 y.o. Female)     Date of Birth   1985    Social Security Number       Address   [address] [place] [address]    Home Phone   [phone]    MRN   [id number]       Jew   None    Marital Status   Single                            Admission Date   3/23/23    Admission Type   Emergency    Admitting Provider   Mekhi Fierro MD    Attending Provider   Mekhi Fierro MD    Department, Room/Bed   [facility] ADULT PSYCHIATRIC, 1022/1S       Discharge Date       Discharge Disposition   Home or Self Care    Discharge Destination                               Attending Provider: Mekhi Fierro MD    Allergies: Fish-derived Products, Mushroom, Onion, Codeine    Isolation: None   Infection: None   Code Status: CPR    Ht: 160 cm (63\")   Wt: 113 kg (249 lb)    Admission Cmt: None   Principal Problem: Bipolar disorder, unspecified (HCC) [F31.9]                 Active Insurance as of 3/23/2023     Primary Coverage     Payor Plan Insurance Group Employer/Plan Group    [employer] [id number]     Payor Plan Address Payor Plan Phone Number Payor Plan Fax Number Effective Dates    [address] [address] [phone]  2023 - None Entered    [facility] [id number]       Subscriber Name Subscriber Birth Date Member ID       AFIA OAKES 1985 [id number]                 Emergency Contacts      (Rel.) Home Phone Work Phone Mobile Phone    JENISE OAKES (Father) -- -- [phone]               Discharge Summary      Mekhi Fierro MD at 23 1040                PSYCHIATRIC DISCHARGE SUMMARY     Patient Identification:  Name:  Afia Oakes  Age:  37 y.o.  Sex:  female  :  1985  MRN:  [id number]  Visit Number:  [id number]    Date of Admission:3/23/2023   Date of Discharge:  3/27/2023    Discharge Diagnosis:  Principal Problem:    Bipolar disorder, unspecified (HCC)  Active Problems:    Tooth abscess      Admission " "Diagnosis:  MDD (major depressive disorder) [F32.9]     Hospital Course  Patient is a 37 y.o. female presented with depression and reports of SI.  Admitted for crisis stabilization.  No acutely concerning labs on admission.  Patient resumed on previously successful fluoxetine 20 mg daily and lamotrigine at decreased dose of 25 mg twice daily, to eventually be titrated by outpatient provider.  Patient reports interpersonal difficulties with her father led to mood disturbance.  She reported intermittent passive SI, but denied active desire to ever hurt herself.  She was pleasant and appropriate during her stay, exhibiting no behavior concerning for harm to herself or others.  She voiced rapid improvement and ready for discharge today, with hopes to be admitted to [facility] before she returns home.  Outpatient care ascertained.    On the day of discharge, patient denied SI, HI or AVH. Patient was stable and appropriate by the conclusion of this admission, denying significant symptoms of mood, psychotic or thought disorder. Patient showed improvement of presenting symptoms and was deemed appropriate for discharge today.    Mental Status Exam upon discharge:   Mood \"better\"   Affect-congruent, appropriate, stable  Thought Content-goal directed, no delusional material present  Thought process-linear, organized.  Suicidality: No SI  Homicidality: No HI  Perception: No AH/VH    Procedures Performed         Consults:   Consults     No orders found from 2/22/2023 to 3/24/2023.          Pertinent Test Results:   Lab Results (last 7 days)     Procedure Component Value Units Date/Time    Urinalysis With Culture If Indicated - Urine, Clean Catch [[id number]]  (Normal) Collected: 03/25/23 1820    Specimen: Urine, Clean Catch Updated: 03/25/23 1842     Color, UA Yellow     Appearance, UA Clear     pH, UA 6.0     Specific Gravity, UA 1.011     Glucose, UA Negative     Ketones, UA Negative     Bilirubin, UA Negative     Blood, UA " Negative     Protein, UA Negative     Leuk Esterase, UA Negative     Nitrite, UA Negative     Urobilinogen, UA 0.2 E.U./dL    Narrative:      In absence of clinical symptoms, the presence of pyuria, bacteria, and/or nitrites on the urinalysis result does not correlate with infection.  Urine microscopic not indicated.          Condition on Discharge:  improved    Vital Signs  Temp:  [98.5 °F (36.9 °C)] 98.5 °F (36.9 °C)  Heart Rate:  [82-92] 82  Resp:  [18] 18  BP: (138-147)/(84-95) 138/84    Discharge Disposition:  Home or Self Care    Discharge Medications:     Discharge Medications      New Medications      Instructions Start Date   FLUoxetine 20 MG capsule  Commonly known as: PROzac   20 mg, Oral, Daily   Start Date: March 28, 2023     lamoTRIgine 25 MG tablet  Commonly known as: LaMICtal   25 mg, Oral, Every 12 Hours Scheduled      metroNIDAZOLE 500 MG tablet  Commonly known as: FLAGYL   500 mg, Oral, Every 8 Hours Scheduled             Discharge Diet: Normal    Activity at Discharge: Normal    Follow-up Appointments  No future appointments.      Test Results Pending at Discharge  None     Time: I spent greater than 30 minutes on this discharge activity which included: face-to-face encounter with the patient, reviewing the data in the system, coordination of the care with the nursing staff as well as consultants, documentation, and entering orders.      Clinician:   Mekhi Fierro MD  03/27/23  10:40 EDT    Electronically signed by Mekhi Fierro MD at 03/27/23 8828